# Patient Record
Sex: MALE
[De-identification: names, ages, dates, MRNs, and addresses within clinical notes are randomized per-mention and may not be internally consistent; named-entity substitution may affect disease eponyms.]

---

## 2020-04-03 ENCOUNTER — TRANSCRIPTION ENCOUNTER (OUTPATIENT)
Age: 30
End: 2020-04-03

## 2020-10-28 ENCOUNTER — APPOINTMENT (OUTPATIENT)
Dept: OTOLARYNGOLOGY | Facility: CLINIC | Age: 30
End: 2020-10-28

## 2020-10-28 PROBLEM — Z00.00 ENCOUNTER FOR PREVENTIVE HEALTH EXAMINATION: Status: ACTIVE | Noted: 2020-10-28

## 2024-10-12 ENCOUNTER — EMERGENCY (EMERGENCY)
Facility: HOSPITAL | Age: 34
LOS: 1 days | Discharge: HOME | End: 2024-10-12
Attending: EMERGENCY MEDICINE | Admitting: EMERGENCY MEDICINE
Payer: OTHER MISCELLANEOUS

## 2024-10-12 VITALS
SYSTOLIC BLOOD PRESSURE: 150 MMHG | RESPIRATION RATE: 16 BRPM | TEMPERATURE: 98 F | OXYGEN SATURATION: 98 % | HEART RATE: 68 BPM | HEIGHT: 67 IN | WEIGHT: 169.98 LBS | DIASTOLIC BLOOD PRESSURE: 78 MMHG

## 2024-10-12 NOTE — ED PROVIDER NOTE - PHYSICAL EXAMINATION
VITAL SIGNS: I have reviewed nursing notes and confirm.  CONST: Well-developed; well-nourished; No apparent distress.  ENT: No nasal discharge; airway clear. TM's clear b/l  EYES: Sclera clear. Pupils round and symmetrical bilaterally.  RESP: Breathing comfortably; speaking in full sentences.   MSK: No acute deformities noted to extremities.   NEURO: Alert, oriented. Speech is fluent and appropriate. Moving all extremities appropriately. No gross abnormalities.  SKIN: Skin is normal temperature; no diaphoresis; no pallor.  PSYCH: Cooperative. Appropriate mood, language, and behavior.

## 2024-10-12 NOTE — ED PROVIDER NOTE - CLINICAL SUMMARY MEDICAL DECISION MAKING FREE TEXT BOX
34-year-old male presenting with tinnitus and mild headache in the setting of noise exposure.  He was an on duty .  His CO level was 4%.  He is a non-smoker.  No acute treatment indicated, he declined treatment for headache.  He will follow-up with the police physician per protocol for ENT evaluation as needed.

## 2024-10-12 NOTE — ED ADULT NURSE NOTE - NS ED NURSE RECORD ANOTHER VITAL SIGN
Called patient. Left a message for the patient to return writers call. Pt will need a F/U appointment in the next few days.   Yes

## 2024-10-12 NOTE — ED PROVIDER NOTE - PATIENT PORTAL LINK FT
You can access the FollowMyHealth Patient Portal offered by [facility] by registering at the following website: [email or web address]. By joining [employer]’s FollowMyHealth portal, you will also be able to view your health information using other applications (apps) compatible with our system.

## 2024-10-12 NOTE — ED PROVIDER NOTE - NSFOLLOWUPINSTRUCTIONS_ED_ALL_ED_FT
Please follow-up with the fire department physician for an ENT referral as needed.    Tinnitus: Care Instructions    Overview  Many people have some ringing sounds in their ears once in a while. You may hear a roar, a hiss, a tinkle, or a buzz. The sound usually lasts only a few minutes. Ringing in the ears that doesn't get better or go away is called tinnitus.    Tinnitus is usually caused by long-term exposure to loud noise. This damages the nerves in the inner ear. It can occur with all types of hearing loss. It may be a symptom of almost any ear problem.    Tinnitus may be caused by a buildup of earwax. Or it may be caused by ear infections or certain medicines (especially antibiotics or large amounts of aspirin). You can also hear noises in your ears because of an injury to the ears or a medical condition.    You may need tests to evaluate your hearing and to find causes of long-lasting tinnitus.    Your doctor may suggest one or more treatments to help you cope with it. You can also do things at home to help reduce symptoms.    Follow-up care is a key part of your treatment and safety. Be sure to make and go to all appointments, and call your doctor or nurse advice line (811 in most provinces and territories) if you are having problems. It's also a good idea to know your test results and keep a list of the medicines you take.    How can you care for yourself at home?  Do not smoke or use other tobacco products. Nicotine reduces blood flow to the ear and makes tinnitus worse. If you need help quitting, talk to your doctor about stop-smoking programs and medicines. These can increase your chances of quitting for good.  Talk to your doctor about whether to stop taking aspirin and similar products such as ibuprofen or naproxen.  Get exercise often. It can improve blood flow to the ear.  Ways to cope with noise  Some tinnitus may last a long time. To cope with noise, try to:    Avoid noises that you think caused your tinnitus. If you can't avoid loud noises, wear earplugs or earmuffs.  Ignore the sound by paying attention to other things.  Relax using biofeedback, meditation, or yoga. Feeling stressed and being tired can make tinnitus worse.  Play music or white noise to help you sleep. Background noise may cover up the noise that you hear in your ears. You can buy a machine that makes soothing sounds, such as ocean waves.  When should you call for help?  	  Call 911 anytime you think you may need emergency care. For example, call if:    You have symptoms of a stroke. These may include:  Sudden numbness, tingling, weakness, or loss of movement in your face, arm, or leg, especially on only one side of your body.  Sudden vision changes.  Sudden trouble speaking.  Sudden confusion or trouble understanding simple statements.  Sudden problems with walking or balance.  A sudden, severe headache that is different from past headaches.  Call your doctor or nurse advice line now or seek immediate medical care if:    You develop other symptoms. These may include hearing loss (or worse hearing loss), balance problems, dizziness, nausea, or vomiting.  Watch closely for changes in your health, and be sure to contact your doctor or nurse advice line if:    Your tinnitus moves from both ears to one ear.  Your hearing loss gets worse within 1 day after an ear injury.  Your tinnitus or hearing loss does not get better within 1 week after an ear injury.  Your tinnitus bothers you enough that you want to take medicines to help you cope with it.

## 2024-10-12 NOTE — ED PROVIDER NOTE - OBJECTIVE STATEMENT
34-year-old male,  YVONNENY ,  presenting with bilateral tinnitus and mild headache.  The tinnitus is slightly worse on the left.  He was exposed to  a high pitched and loud hissing noise from a steam pipe for four hours. For two of these hours he says were when the noise was extremely loud.  they were involved in the repair of a leaking pipe containing natural gas.  He has not taken anything for his symptoms and has no other symptoms.  They were not concerned for carbon monoxide exposure.
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
## 2024-10-15 DIAGNOSIS — R51.9 HEADACHE, UNSPECIFIED: ICD-10-CM
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
## 2024-10-15 DIAGNOSIS — H93.13 TINNITUS, BILATERAL: ICD-10-CM
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
